# Patient Record
Sex: FEMALE | Race: WHITE | NOT HISPANIC OR LATINO
[De-identification: names, ages, dates, MRNs, and addresses within clinical notes are randomized per-mention and may not be internally consistent; named-entity substitution may affect disease eponyms.]

---

## 2019-04-05 PROBLEM — Z00.00 ENCOUNTER FOR PREVENTIVE HEALTH EXAMINATION: Status: ACTIVE | Noted: 2019-04-05

## 2019-05-06 ENCOUNTER — APPOINTMENT (OUTPATIENT)
Dept: GASTROENTEROLOGY | Facility: CLINIC | Age: 69
End: 2019-05-06
Payer: MEDICARE

## 2019-05-06 VITALS
SYSTOLIC BLOOD PRESSURE: 146 MMHG | HEIGHT: 60 IN | DIASTOLIC BLOOD PRESSURE: 86 MMHG | HEART RATE: 96 BPM | BODY MASS INDEX: 23.95 KG/M2 | WEIGHT: 122 LBS

## 2019-05-06 PROCEDURE — 99213 OFFICE O/P EST LOW 20 MIN: CPT

## 2019-05-06 NOTE — ASSESSMENT
[FreeTextEntry1] : this is a healthy [age] year old patient, here for routine fu colonoscopy, screening, first colonoscopy since more than ten years ago\par \par patient is of average risk\par \par no gi symptoms\par \par no co morbidities\par \par More than 50% of the face to face time was devoted to counseling and /or coordination of care.  THis coordination of care may have included reviewing other medical notes and reports, and communicating with other health professionals\par \par \par AS WE OBTAIN INFORMED CONSENT FOR COLONOSCOPY, UPPER ENDOSCOPY [EGD], OR BOTH PROCEDURES TOGETHER;\par \par As with all procedures, there are risks of which the patient should be aware\par \par 1.  Anesthesia; deep sedation with Propofol;  there is a small risk of aspiration and pulmonary infection.  The Anesthesiologist meets with the patient the morning of the procedure to discuss in more detail\par \par 2.  risk of bleeding; from removal of a polyp, or rarely, from biopsies, 1 % or less\par \par 3.  risk of injury or perforation of the colon or upper GI tract; one in a thousand or less,  from removing a polyp or from advancing the instrument\par \par \par \par DR FIELD RECOMMENDATIONS FOR OPTIMAL BOWEL PREP\par \par 1. split the Miralax drink: half the night before, and half the morning of\par 2.  dulcolax 10 mg [2 5 mg tablets]  before each half of the drink\par 3.  diet for breakfast and lunch the day before prep; light solid food, easily chewable, easily digested\par 4.  begin the prep the night before sometime after five pm, one hour after dulcolax is taken\par 5.  the night before; goal is to have loose bowel movements; take one to three extra doses of Miralax, either a 17 gram packet, or a scoopful of the powder, if needed to achieve loose stools\par 6.  the goal the morning of is to have clear liquid stools; otherwise, after the second half of the prep, you should take one to three extra doses of Miralax [see above]\par 7.  remember; the extra doses are only if you are not achieving good results; and the well prepped colon allows a more complete exam\par

## 2019-05-06 NOTE — HISTORY OF PRESENT ILLNESS
[FreeTextEntry1] : this is a healthy [age] yo patient, who is here to discuss her first colonoscopy since 2009\par \par the first or at least last colonoscopy was negative, except for a few diverticula.\par \par there is nohx of GI or other symptoms\par \par no cardio vascular sx., no angina, no tachycardia or bradycardia.\par \par only medication is calcium\par \par no fh of colon neoplasia\par \par

## 2019-05-07 ENCOUNTER — TRANSCRIPTION ENCOUNTER (OUTPATIENT)
Age: 69
End: 2019-05-07

## 2019-05-17 ENCOUNTER — APPOINTMENT (OUTPATIENT)
Dept: GASTROENTEROLOGY | Facility: HOSPITAL | Age: 69
End: 2019-05-17

## 2023-05-09 ENCOUNTER — NON-APPOINTMENT (OUTPATIENT)
Age: 73
End: 2023-05-09

## 2023-05-25 ENCOUNTER — APPOINTMENT (OUTPATIENT)
Dept: OBGYN | Facility: CLINIC | Age: 73
End: 2023-05-25
Payer: MEDICARE

## 2023-05-25 VITALS
DIASTOLIC BLOOD PRESSURE: 72 MMHG | WEIGHT: 117 LBS | BODY MASS INDEX: 22.97 KG/M2 | SYSTOLIC BLOOD PRESSURE: 120 MMHG | HEIGHT: 60 IN

## 2023-05-25 DIAGNOSIS — Z83.49 FAMILY HISTORY OF OTHER ENDOCRINE, NUTRITIONAL AND METABOLIC DISEASES: ICD-10-CM

## 2023-05-25 DIAGNOSIS — Z11.51 ENCOUNTER FOR SCREENING FOR HUMAN PAPILLOMAVIRUS (HPV): ICD-10-CM

## 2023-05-25 DIAGNOSIS — Z85.820 PERSONAL HISTORY OF MALIGNANT MELANOMA OF SKIN: ICD-10-CM

## 2023-05-25 DIAGNOSIS — Z86.39 PERSONAL HISTORY OF OTHER ENDOCRINE, NUTRITIONAL AND METABOLIC DISEASE: ICD-10-CM

## 2023-05-25 DIAGNOSIS — Z80.8 FAMILY HISTORY OF MALIGNANT NEOPLASM OF OTHER ORGANS OR SYSTEMS: ICD-10-CM

## 2023-05-25 DIAGNOSIS — I10 ESSENTIAL (PRIMARY) HYPERTENSION: ICD-10-CM

## 2023-05-25 DIAGNOSIS — Z12.4 ENCOUNTER FOR SCREENING FOR MALIGNANT NEOPLASM OF CERVIX: ICD-10-CM

## 2023-05-25 DIAGNOSIS — Z78.9 OTHER SPECIFIED HEALTH STATUS: ICD-10-CM

## 2023-05-25 DIAGNOSIS — Z01.419 ENCOUNTER FOR GYNECOLOGICAL EXAMINATION (GENERAL) (ROUTINE) W/OUT ABNORMAL FINDINGS: ICD-10-CM

## 2023-05-25 PROCEDURE — G0101: CPT

## 2023-05-25 RX ORDER — LEVOTHYROXINE SODIUM 0.17 MG/1
TABLET ORAL
Refills: 0 | Status: ACTIVE | COMMUNITY

## 2023-05-25 RX ORDER — ALENDRONATE SODIUM 35 MG/1
TABLET ORAL
Refills: 0 | Status: ACTIVE | COMMUNITY

## 2023-05-25 RX ORDER — AMLODIPINE BESYLATE 5 MG/1
TABLET ORAL
Refills: 0 | Status: ACTIVE | COMMUNITY

## 2023-05-25 NOTE — HISTORY OF PRESENT ILLNESS
[FreeTextEntry1] : 71 y/o  (C-Sec x 2) presents for annual GYN exam.\par \par  and sexually active without any issues.\par \par No PMB. Never on HRT.\par \par No GYN complaints.\par \par Overall good health.\par \par Paps have always been normal. Desires pap today.\par \par Mammogram 10/24/22- BI-RADS 1\par \par Bone density- followed by primary for osteoporosis.\par \par Colonoscopy 2019- Normal, recall 7-10 years.\par \par Lives with .\par \par Former xray tech.\par \par Volunteers at gift shop\par \par Has a one daughter that lives in [place], (2 granddaughters). One daughter lives in [place] (1 granddaughter).\par

## 2023-05-25 NOTE — PHYSICAL EXAM
[Chaperone Declined] : Patient declined chaperone [Appropriately responsive] : appropriately responsive [Alert] : alert [No Acute Distress] : no acute distress [No Lymphadenopathy] : no lymphadenopathy [Regular Rate Rhythm] : regular rate rhythm [No Murmurs] : no murmurs [Clear to Auscultation B/L] : clear to auscultation bilaterally [Soft] : soft [Non-tender] : non-tender [Non-distended] : non-distended [No HSM] : No HSM [No Mass] : no mass [Oriented x3] : oriented x3 [Examination Of The Breasts] : a normal appearance [No Discharge] : no discharge [No Masses] : no breast masses were palpable [No Lesions] : no lesions  [Labia Majora] : normal [Labia Minora] : normal [Atrophy] : atrophy [Normal] : normal [Uterine Adnexae] : normal [Tenderness] : nontender [Enlarged ___ wks] : not enlarged [FreeTextEntry5] : no CMT

## 2023-05-26 LAB — HPV HIGH+LOW RISK DNA PNL CVX: NOT DETECTED

## 2023-06-01 ENCOUNTER — TRANSCRIPTION ENCOUNTER (OUTPATIENT)
Age: 73
End: 2023-06-01

## 2023-06-01 LAB — CYTOLOGY CVX/VAG DOC THIN PREP: ABNORMAL
